# Patient Record
Sex: FEMALE | Race: WHITE | NOT HISPANIC OR LATINO
[De-identification: names, ages, dates, MRNs, and addresses within clinical notes are randomized per-mention and may not be internally consistent; named-entity substitution may affect disease eponyms.]

---

## 2024-06-05 DIAGNOSIS — O02.1 MISSED ABORTION: ICD-10-CM

## 2024-06-05 DIAGNOSIS — N92.6 IRREGULAR MENSTRUATION, UNSPECIFIED: ICD-10-CM

## 2024-06-05 PROBLEM — Z00.00 ENCOUNTER FOR PREVENTIVE HEALTH EXAMINATION: Status: ACTIVE | Noted: 2024-06-05

## 2024-06-06 LAB
ABO + RH PNL BLD: NORMAL
BASOPHILS # BLD AUTO: 0.04 K/UL
BASOPHILS NFR BLD AUTO: 0.5 %
EOSINOPHIL # BLD AUTO: 0.07 K/UL
EOSINOPHIL NFR BLD AUTO: 0.9 %
HCT VFR BLD CALC: 39.7 %
HGB BLD-MCNC: 12.8 G/DL
IMM GRANULOCYTES NFR BLD AUTO: 0.3 %
LYMPHOCYTES # BLD AUTO: 2.13 K/UL
LYMPHOCYTES NFR BLD AUTO: 26.8 %
MAN DIFF?: NORMAL
MCHC RBC-ENTMCNC: 29.8 PG
MCHC RBC-ENTMCNC: 32.2 GM/DL
MCV RBC AUTO: 92.5 FL
MONOCYTES # BLD AUTO: 0.55 K/UL
MONOCYTES NFR BLD AUTO: 6.9 %
NEUTROPHILS # BLD AUTO: 5.13 K/UL
NEUTROPHILS NFR BLD AUTO: 64.6 %
PLATELET # BLD AUTO: 232 K/UL
RBC # BLD: 4.29 M/UL
RBC # FLD: 14 %
WBC # FLD AUTO: 7.94 K/UL

## 2024-06-07 ENCOUNTER — APPOINTMENT (OUTPATIENT)
Dept: OBGYN | Facility: CLINIC | Age: 24
End: 2024-06-07
Payer: COMMERCIAL

## 2024-06-07 ENCOUNTER — APPOINTMENT (OUTPATIENT)
Dept: ANTEPARTUM | Facility: CLINIC | Age: 24
End: 2024-06-07

## 2024-06-07 ENCOUNTER — ASOB RESULT (OUTPATIENT)
Age: 24
End: 2024-06-07

## 2024-06-07 VITALS
BODY MASS INDEX: 27.58 KG/M2 | WEIGHT: 182 LBS | SYSTOLIC BLOOD PRESSURE: 122 MMHG | DIASTOLIC BLOOD PRESSURE: 80 MMHG | HEIGHT: 68 IN

## 2024-06-07 DIAGNOSIS — Z34.90 ENCOUNTER FOR SUPERVISION OF NORMAL PREGNANCY, UNSPECIFIED, UNSPECIFIED TRIMESTER: ICD-10-CM

## 2024-06-07 DIAGNOSIS — Z78.9 OTHER SPECIFIED HEALTH STATUS: ICD-10-CM

## 2024-06-07 DIAGNOSIS — F17.290 NICOTINE DEPENDENCE, OTHER TOBACCO PRODUCT, UNCOMPLICATED: ICD-10-CM

## 2024-06-07 PROCEDURE — 99204 OFFICE O/P NEW MOD 45 MIN: CPT

## 2024-06-07 PROCEDURE — 76815 OB US LIMITED FETUS(S): CPT

## 2024-06-07 PROCEDURE — 76817 TRANSVAGINAL US OBSTETRIC: CPT

## 2024-06-07 PROCEDURE — 76815 OB US LIMITED FETUS(S): CPT | Mod: 59

## 2024-06-07 PROCEDURE — S0190: CPT

## 2024-06-07 RX ORDER — OXYCODONE AND ACETAMINOPHEN 5; 325 MG/1; MG/1
5-325 TABLET ORAL
Qty: 5 | Refills: 0 | Status: ACTIVE | COMMUNITY
Start: 2024-06-07 | End: 1900-01-01

## 2024-06-07 RX ORDER — IBUPROFEN 600 MG/1
600 TABLET, FILM COATED ORAL 4 TIMES DAILY
Qty: 20 | Refills: 0 | Status: ACTIVE | COMMUNITY
Start: 2024-06-07 | End: 1900-01-01

## 2024-06-07 RX ORDER — MISOPROSTOL 200 UG/1
200 TABLET ORAL
Qty: 8 | Refills: 0 | Status: ACTIVE | COMMUNITY
Start: 2024-06-07 | End: 1900-01-01

## 2024-06-07 RX ORDER — ONDANSETRON 4 MG/1
4 TABLET, ORALLY DISINTEGRATING ORAL
Qty: 5 | Refills: 0 | Status: ACTIVE | COMMUNITY
Start: 2024-06-07 | End: 1900-01-01

## 2024-06-07 NOTE — PHYSICAL EXAM
[Appropriately responsive] : appropriately responsive [Alert] : alert [No Acute Distress] : no acute distress [Soft] : soft [Non-tender] : non-tender [Non-distended] : non-distended [Oriented x3] : oriented x3 [Normal] : normal external genitalia

## 2024-06-07 NOTE — COUNSELING
[TextEntry] : Medical   Patient denies medical history of: Liver disease Renal failure Chronic adrenal failure Long term systemic corticosteroid use IUD in place Anticoagulant use of hemorrhagic disorder Inherited porphyrias Anemia Respiratory disease Diabetes Breastfeeding Heart disease or hypertension Allergy to mifepristone, misoprostol or other prostaglandins   Options for the pregnancy were discussed with the patient, including continuation of pregnancy, medical , dilation and curettage (D&C) in the office under local anesthesia or in the operating room under sedation. Given the pregnancy is undesired, the patient would prefer not to continue the pregnancy.  They do not desire to continue the pregnancy and are requesting a medical .  The risk of harm to subsequent pregnancies or the ability to carry a subsequent child to term, and adverse psychological effects was discussed.    The risks of medical  including:  -	Cramping -	Bleeding -	Fever, diarrhea, nausea, vomiting, headache, dizziness, back pain, feeling tired -	Emotional changes -	Continuing pregnancy and the need for further medication or surgery -	Blood clots in the uterus causing cramping and abdominal pain necessitation further medication or surgery -	Incomplete  causing heavy bleeding, infection, or both (which may require other testing or treatments such as further medication or surgery) -	Bleeding too much or too long which may require further treatment with medication or surgery, or a blood transfusion -	Infection in the uterus, which may require further treatment with medication, surgery or antibiotics.  It may also require hospital admission -	Allergic reaction to any or all of the medications used -	Death from any or all of the medications used   1.	Patient agrees to undergo surgical  if medical  fails.   2.	 The patient states they have a nearby hospital if the need for emergency services arises.  They have access to a telephone, emergency medical care and a hospital. 3.	The patient states they have someone to be with them at the time of the medical .  4.	The patient is willing and able to follow up to confirm the pregnancy was successfully terminated.  The patient was thoroughly counseled on instructions for medical  and the warning signs of any problems.  They voiced understanding of these warning signs and when to call and were provided with 24-hour contact information for on-call and available physicians.   They were also informed that no promise can be made about the outcome of the  and that medical  is not reversible.  The patient also understands it is their responsibility to bring to the attention of their physician any unusual symptoms following the procedure and to report to follow-up phone calls and/or examinations.   They understand the need to call the office if they have no bleeding in 24 hours after misoprostol as this could mean either the medical  did not work, or something such as an ectopic pregnancy occurred.   The patient is sure of their decision and denies any coercion from family, friends or healthcare providers. The patient had the opportunity to ask questions and all questions were answered.

## 2024-06-07 NOTE — PROCEDURE
[Transvaginal OB Sonogram] : Transvaginal OB Sonogram [Intrauterine Pregnancy] : intrauterine pregnancy [Yolk Sac] : yolk sac present [Fetal Heart] : no fetal heart [CRL: ___ (mm)] : CRL - [unfilled]Umm [FreeTextEntry1] : IUP with yolk sac, no FHR  possibly angular pregnancy

## 2024-06-07 NOTE — PLAN
[FreeTextEntry1] : 23 year   @ 6w4d   requesting medication  due to unplanned, undesired pregnancy.  1. Medical   - All consents signed today, all questions/concerns addressed  2. Scheduling - Patient to be precertified for D+C -Had patient scanned by MFM to r/o cornual pregnancy. Myometrium around gestational sac >5mm so low concern for cornual pregnancy.  - mifepristone consents signed, mifepristone pamphlet given - Mifepristone 200 mg administered, lot #:[id number]  Exp: 2028  3. ID/Neuro -GC/CT obtained - pt declines HIV/RPR/hepatitis testing - Prescription for Percocet 5/325mg #6 with no refills given - [facility] ISTOP authorization number: [id number] - Reviewed ibuprofen 600 mg q6 hours  4. Testing - Tests/imaging ordered today: G/C - Tests results/ notes reviewed today: CBC WNL, Rh pos - Imaging personally reviewed today: none   5. Contraception/Undesired fertility Status/condition: chronic, with side effects: patients goals not met, has unplanned pregnancy - Patient counseled on all contraceptive options -will place IUD at the completion of the procedure  6. Post op - Pt given option for 2 week in-office follow up vs. 1 week phone call with office and home pregnancy test in 4 weeks - pt desires: 2 week in office follow up - Medical  instruction and information packet given, reviewed bleeding and infection precautions.  24 hour contact information reviewed and provided - all questions/concerns addressed

## 2024-06-10 LAB
C TRACH RRNA SPEC QL NAA+PROBE: NOT DETECTED
N GONORRHOEA RRNA SPEC QL NAA+PROBE: NOT DETECTED
SOURCE AMPLIFICATION: NORMAL

## 2024-06-17 ENCOUNTER — NON-APPOINTMENT (OUTPATIENT)
Age: 24
End: 2024-06-17

## 2024-06-21 ENCOUNTER — APPOINTMENT (OUTPATIENT)
Dept: OBGYN | Facility: CLINIC | Age: 24
End: 2024-06-21

## 2024-06-21 ENCOUNTER — TRANSCRIPTION ENCOUNTER (OUTPATIENT)
Age: 24
End: 2024-06-21

## 2024-06-21 VITALS — SYSTOLIC BLOOD PRESSURE: 117 MMHG | DIASTOLIC BLOOD PRESSURE: 76 MMHG | HEIGHT: 68 IN

## 2024-06-21 PROCEDURE — 58300 INSERT INTRAUTERINE DEVICE: CPT

## 2024-06-21 PROCEDURE — 99459 PELVIC EXAMINATION: CPT

## 2024-06-21 PROCEDURE — 76817 TRANSVAGINAL US OBSTETRIC: CPT

## 2024-06-21 PROCEDURE — 76998 US GUIDE INTRAOP: CPT | Mod: 59

## 2024-06-21 PROCEDURE — 99213 OFFICE O/P EST LOW 20 MIN: CPT | Mod: 25

## 2024-06-21 NOTE — PROCEDURE
[F/U Medical ] : f/u medical  [Transvaginal Ultrasound] : transvaginal ultrasound [FreeTextEntry3] : EMT: 0.26cm [TextEntry] : The patient was counseled on the risks, benefits, and alternatives to the LNG-IUD. The patient understands the risks of infection, bleeding and trauma (specifically the vagina, cervix, uterus, ovaries, fallopian tubes, bowel, bladder, ureters, rectum, pelvic nerves and blood vessels) and the small chance of IUD embedment, incorrect placement, incomplete removal and uterine perforation. They voiced understanding that if uterine perforation, embedment or incomplete removal occurs that laparoscopy or hysteroscopy might be necessary to remove the IUD, and may or may not be successful in IUD removal.  The patient was counseled on potential side effects including changes in bleeding, specifically irregular bleeding in the first 3-6 months followed by increased likelihood of amenorrhea. They were counseled on the increased risk of infection in the 3 weeks following insertion, the risk of pregnancy, and of ectopic pregnancy (and the need to see a physician immediately if she experiences signs/symptoms of pregnancy) and the risk of expulsion. Reviews need for back up method of birth control for 1 week. They were informed that IUDs do not protect against STD's and encouraged to use condoms. They understand the need to follow up for a string check. All questions were answered.  Pelvic Exam: Uterus: anteverted  Pre-operative time out performed.  Patients name, date of birth and procedure confirmed.  Speculum placed,  cervix cleansed x3 with betadyne.  A single tooth tenaculum was placed on the anterior lip of the cervix after infiltration with 10 cc of 1% lidocaine.  A LNG-IUS was brought onto the sterile field and inserted per protocol to the uterine fundus under direct ultrasound guidance in the usual fashion.  Placement confirmed on ultrasound in the sagittal and transverse positions. Strings were trimmed to 2 cm.   Excellent hemostasis was noted.   The patient tolerated the procedure well. EBL: minimal  Lot#: [id number] Expiration: 08/2026

## 2024-06-21 NOTE — HISTORY OF PRESENT ILLNESS
[FreeTextEntry1] : 22 y/o  s/p med ab at 6w4d on 24 presents for follow up  Date Mifepristone:  Date misoprostol:  Question: 	 1.	Did you have cramping+ bleeding heavier than a period within 24 hours of taking misoprostol? Y 2.	Do you feel like you passed the pregnancy (as if you had a miscarriage)? a.	Passing of clots/tissue: Y 3.	Are your pregnancy symptoms resolving? still has nausea  a.	Nausea/vomiting, breast tenderness: no breast tenderness 4.	Is your bleeding lighter now than the heaviest bleeding after misoprostol? spotting

## 2024-06-21 NOTE — PLAN
[FreeTextEntry1] : 24 y/o  s/p med ab at 6w4d on 24 presents for follow up  1.Med AB -Patient is recovering well.  No signs/symptoms of infection.  -Endometrial stripe: 0.26cm -Pt denies symptoms of pregnancy -Reviewed that first period may be heavier than normal.  -Patient is cleared to return to all physical activities  2.Contraception - Mirena IUD placed today, effective till   3.  Psych - Discussed normal grieving process, reviewed support people - pt given social work/psych information sheet at consultation

## 2024-06-21 NOTE — PHYSICAL EXAM
[Chaperone Present] : A chaperone was present in the examining room during all aspects of the physical examination [[id number]] : A chaperone was present during the pelvic exam. [FreeTextEntry2] : Brigid Hernandez [Appropriately responsive] : appropriately responsive [Alert] : alert [No Acute Distress] : no acute distress [Soft] : soft [Non-tender] : non-tender [Non-distended] : non-distended [Oriented x3] : oriented x3 [Labia Majora] : normal [Labia Minora] : normal [Normal] : normal [Uterine Adnexae] : normal